# Patient Record
Sex: MALE | Race: WHITE | URBAN - NONMETROPOLITAN AREA
[De-identification: names, ages, dates, MRNs, and addresses within clinical notes are randomized per-mention and may not be internally consistent; named-entity substitution may affect disease eponyms.]

---

## 2023-05-24 ENCOUNTER — WEB ENCOUNTER (OUTPATIENT)
Dept: URBAN - NONMETROPOLITAN AREA CLINIC 2 | Facility: CLINIC | Age: 58
End: 2023-05-24

## 2023-05-24 ENCOUNTER — OFFICE VISIT (OUTPATIENT)
Dept: URBAN - NONMETROPOLITAN AREA CLINIC 2 | Facility: CLINIC | Age: 58
End: 2023-05-24
Payer: COMMERCIAL

## 2023-05-24 ENCOUNTER — DASHBOARD ENCOUNTERS (OUTPATIENT)
Age: 58
End: 2023-05-24

## 2023-05-24 VITALS
HEIGHT: 71 IN | WEIGHT: 235 LBS | DIASTOLIC BLOOD PRESSURE: 80 MMHG | BODY MASS INDEX: 32.9 KG/M2 | SYSTOLIC BLOOD PRESSURE: 119 MMHG | HEART RATE: 61 BPM

## 2023-05-24 DIAGNOSIS — K62.5 RECTAL BLEEDING: ICD-10-CM

## 2023-05-24 DIAGNOSIS — K21.9 CHRONIC GERD: ICD-10-CM

## 2023-05-24 DIAGNOSIS — R10.30 LOWER ABDOMINAL PAIN: ICD-10-CM

## 2023-05-24 DIAGNOSIS — Z86.010 PERSONAL HISTORY OF COLONIC POLYPS: ICD-10-CM

## 2023-05-24 PROBLEM — 235595009: Status: ACTIVE | Noted: 2023-05-24

## 2023-05-24 PROBLEM — 428283002: Status: ACTIVE | Noted: 2023-05-24

## 2023-05-24 PROCEDURE — 99204 OFFICE O/P NEW MOD 45 MIN: CPT | Performed by: INTERNAL MEDICINE

## 2023-05-24 NOTE — PHYSICAL EXAM NEUROLOGIC:
oriented to person, place and time , normal sensation , short and long term memory intact
Patient Needs Assistance to Leave Residence...

## 2023-05-24 NOTE — HPI-TODAY'S VISIT:
Mr. Emeterio Angulo is a pleasant 57-year-old female who presents for follow-up of colon polyps as well as chronic GERD.  He also has a history of dietary sensitivities report and small particle foods which he reports caused him to have lower abdominal discomfort and sometimes rectal bleeding.  The rectal bleeding is bright red in nature and is intermittent.  He feels it is probably hemorrhoidal.  He has been on omeprazole for at least 5 to 7 years.  He had an EGD and colonoscopy in 2017 with Dr. Fink.  EGD was normal and colonoscopy had 1 small TA polyp as well as a nonspecific area of erythema in the sigmoid colon which was biopsied.  Today he reports he is doing well.  His rectal bleeding has stopped with dietary changes including probiotic and eating more fiber.

## 2023-07-24 ENCOUNTER — TELEPHONE ENCOUNTER (OUTPATIENT)
Dept: URBAN - NONMETROPOLITAN AREA CLINIC 2 | Facility: CLINIC | Age: 58
End: 2023-07-24

## 2023-07-25 ENCOUNTER — OFFICE VISIT (OUTPATIENT)
Dept: URBAN - NONMETROPOLITAN AREA SURGERY CENTER 1 | Facility: SURGERY CENTER | Age: 58
End: 2023-07-25
Payer: COMMERCIAL

## 2023-07-25 ENCOUNTER — CLAIMS CREATED FROM THE CLAIM WINDOW (OUTPATIENT)
Dept: URBAN - METROPOLITAN AREA CLINIC 4 | Facility: CLINIC | Age: 58
End: 2023-07-25
Payer: COMMERCIAL

## 2023-07-25 DIAGNOSIS — D12.0 ADENOMA OF CECUM: ICD-10-CM

## 2023-07-25 DIAGNOSIS — Z86.010 ADENOMAS PERSONAL HISTORY OF COLONIC POLYPS: ICD-10-CM

## 2023-07-25 DIAGNOSIS — D12.3 ADENOMA OF TRANSVERSE COLON: ICD-10-CM

## 2023-07-25 DIAGNOSIS — D12.0 BENIGN NEOPLASM OF CECUM: ICD-10-CM

## 2023-07-25 DIAGNOSIS — K21.9 ACID REFLUX: ICD-10-CM

## 2023-07-25 PROCEDURE — 43235 EGD DIAGNOSTIC BRUSH WASH: CPT | Performed by: INTERNAL MEDICINE

## 2023-07-25 PROCEDURE — G8907 PT DOC NO EVENTS ON DISCHARG: HCPCS | Performed by: INTERNAL MEDICINE

## 2023-07-25 PROCEDURE — 88305 TISSUE EXAM BY PATHOLOGIST: CPT | Performed by: PATHOLOGY

## 2023-07-25 PROCEDURE — 45385 COLONOSCOPY W/LESION REMOVAL: CPT | Performed by: INTERNAL MEDICINE
